# Patient Record
Sex: FEMALE | Race: BLACK OR AFRICAN AMERICAN | Employment: FULL TIME | URBAN - METROPOLITAN AREA
[De-identification: names, ages, dates, MRNs, and addresses within clinical notes are randomized per-mention and may not be internally consistent; named-entity substitution may affect disease eponyms.]

---

## 2020-08-25 ENCOUNTER — HOSPITAL ENCOUNTER (EMERGENCY)
Facility: HOSPITAL | Age: 45
Discharge: HOME | End: 2020-08-25
Attending: EMERGENCY MEDICINE | Admitting: EMERGENCY MEDICINE
Payer: COMMERCIAL

## 2020-08-25 VITALS
TEMPERATURE: 98.4 F | HEIGHT: 64 IN | SYSTOLIC BLOOD PRESSURE: 127 MMHG | OXYGEN SATURATION: 99 % | BODY MASS INDEX: 34.51 KG/M2 | DIASTOLIC BLOOD PRESSURE: 65 MMHG | HEART RATE: 110 BPM | WEIGHT: 202.16 LBS | RESPIRATION RATE: 18 BRPM

## 2020-08-25 DIAGNOSIS — L73.2 HIDRADENITIS: Primary | ICD-10-CM

## 2020-08-25 DIAGNOSIS — L02.91 ABSCESS: ICD-10-CM

## 2020-08-25 PROCEDURE — 99284 EMERGENCY DEPT VISIT MOD MDM: CPT | Performed by: EMERGENCY MEDICINE

## 2020-08-25 PROCEDURE — 99282 EMERGENCY DEPT VISIT SF MDM: CPT

## 2020-08-25 PROCEDURE — 10060 I&D ABSCESS SIMPLE/SINGLE: CPT | Performed by: EMERGENCY MEDICINE

## 2020-08-25 RX ORDER — LIDOCAINE HYDROCHLORIDE AND EPINEPHRINE 10; 10 MG/ML; UG/ML
5 INJECTION, SOLUTION INFILTRATION; PERINEURAL ONCE
Status: COMPLETED | OUTPATIENT
Start: 2020-08-25 | End: 2020-08-25

## 2020-08-25 RX ORDER — LIDOCAINE 40 MG/G
CREAM TOPICAL ONCE
Status: COMPLETED | OUTPATIENT
Start: 2020-08-25 | End: 2020-08-25

## 2020-08-25 RX ORDER — DOXYCYCLINE HYCLATE 100 MG/1
100 CAPSULE ORAL ONCE
Status: COMPLETED | OUTPATIENT
Start: 2020-08-25 | End: 2020-08-25

## 2020-08-25 RX ORDER — DOXYCYCLINE HYCLATE 100 MG/1
100 CAPSULE ORAL 2 TIMES DAILY
Qty: 20 CAPSULE | Refills: 0 | Status: SHIPPED | OUTPATIENT
Start: 2020-08-25 | End: 2020-09-04

## 2020-08-25 RX ADMIN — DOXYCYCLINE 100 MG: 100 CAPSULE ORAL at 18:44

## 2020-08-25 RX ADMIN — LIDOCAINE HYDROCHLORIDE AND EPINEPHRINE 5 ML: 10; 10 INJECTION, SOLUTION INFILTRATION; PERINEURAL at 18:41

## 2020-08-25 RX ADMIN — LIDOCAINE 1 APPLICATION: 40 CREAM TOPICAL at 17:48

## 2020-08-25 NOTE — ED PROVIDER NOTES
History  Chief Complaint   Patient presents with    Abscess     pt sent for left groin abscess, sent by urgent care for further eval of abscess and wbc count  hx of similar s/s  Patient is a 30-year-old female with a history of diabetes and hidradenitis suppurativa who presents with an abscess  Patient states that she developed a left inguinal abscess 2 days ago  She has had similar abscesses in the past   They occasionally drained spontaneously  However this one has not been draining and has become more swollen and painful  She went to an urgent care facility and was sent to the emergency department for further evaluation  She denies fever, chills, nausea, vomiting or other complaints  She had surgery for similar issues in her bilateral axillary regions  She is seeing a surgeon on September 1, 2020 to discuss possible surgery to remove the sweat glands in her inguinal regions  She denies a history of MRSA  History provided by:  Patient  Abscess   Location:  Torso and pelvis  Pelvic abscess location:  Groin and pelvis  Abscess quality: draining, fluctuance, induration and painful    Red streaking: no    Progression:  Worsening  Pain details:     Quality:  Pressure    Severity:  Moderate    Timing:  Constant    Progression:  Worsening  Chronicity:  Recurrent  Context: diabetes    Ineffective treatments:  Warm water soaks  Associated symptoms: no fatigue, no fever, no headaches, no nausea and no vomiting    Risk factors: prior abscess    Risk factors: no hx of MRSA        None       Past Medical History:   Diagnosis Date    Diabetes mellitus ([facility] [address] )        History reviewed  No pertinent surgical history  History reviewed  No pertinent family history  I have reviewed and agree with the history as documented      E-Cigarette/Vaping     E-Cigarette/Vaping Substances     Social History     Tobacco Use    Smoking status: Never Smoker    Smokeless tobacco: Never Used   Substance Use Topics    Alcohol use: Yes     Comment: social    Drug use: Never       Review of Systems   Constitutional: Negative for chills, diaphoresis, fatigue and fever  HENT: Negative for nosebleeds, sore throat and trouble swallowing  Eyes: Negative for photophobia, pain and visual disturbance  Respiratory: Negative for cough, chest tightness and shortness of breath  Cardiovascular: Negative for chest pain, palpitations and leg swelling  Gastrointestinal: Negative for abdominal pain, constipation, diarrhea, nausea and vomiting  Endocrine: Negative for polydipsia and polyuria  Genitourinary: Negative for difficulty urinating, dysuria, hematuria, pelvic pain, vaginal bleeding and vaginal discharge  Musculoskeletal: Negative for back pain, neck pain and neck stiffness  Skin: Negative for pallor and rash  Neurological: Negative for dizziness, seizures, light-headedness and headaches  All other systems reviewed and are negative  Physical Exam  Physical Exam  Vitals signs and nursing note reviewed  Constitutional:       General: She is not in acute distress  Appearance: She is well-developed  HENT:      Head: Normocephalic and atraumatic  Eyes:      Pupils: Pupils are equal, round, and reactive to light  Neck:      Musculoskeletal: Normal range of motion and neck supple  Cardiovascular:      Rate and Rhythm: Normal rate and regular rhythm  Pulses: Normal pulses  Heart sounds: Normal heart sounds  Pulmonary:      Effort: Pulmonary effort is normal  No respiratory distress  Breath sounds: Normal breath sounds  Abdominal:      General: There is no distension  Palpations: Abdomen is soft  Abdomen is not rigid  Tenderness: There is no abdominal tenderness  There is no guarding or rebound  Musculoskeletal: Normal range of motion  General: No tenderness  Lymphadenopathy:      Cervical: No cervical adenopathy  Skin:     General: Skin is warm and dry        Capillary Refill: Capillary refill takes less than 2 seconds  Findings: Abscess (area of flutuance and tenderness in the left pelvis adjacent to the inguinal crease  there is a pinpoint area of purulent drainage  ) present  Neurological:      Mental Status: She is alert and oriented to person, place, and time  Cranial Nerves: No cranial nerve deficit  Sensory: No sensory deficit  Vital Signs  ED Triage Vitals   Temperature Pulse Respirations Blood Pressure SpO2   08/25/20 1728 08/25/20 1728 08/25/20 1728 08/25/20 1728 08/25/20 1728   98 4 °F (36 9 °C) (!) 110 18 127/65 99 %      Temp Source Heart Rate Source Patient Position - Orthostatic VS BP Location FiO2 (%)   08/25/20 1728 08/25/20 1728 08/25/20 1728 08/25/20 1728 --   Oral Monitor Sitting Right arm       Pain Score       08/25/20 1726       Worst Possible Pain           Vitals:    08/25/20 1728   BP: 127/65   Pulse: (!) 110   Patient Position - Orthostatic VS: Sitting         Visual Acuity      ED Medications  Medications   lidocaine (LMX) 4 % cream (1 application Topical Given 8/25/20 1748)   lidocaine-epinephrine (XYLOCAINE/EPINEPHRINE) 1 %-1:100,000 injection 5 mL (5 mL Infiltration Given by Other 8/25/20 1841)   doxycycline hyclate (VIBRAMYCIN) capsule 100 mg (100 mg Oral Given 8/25/20 1844)       Diagnostic Studies  Results Reviewed     None                 No orders to display              Procedures  Incision and drain    Date/Time: 8/25/2020 6:00 PM  Performed by: Gail Galvan DO  Authorized by: Gail Galvan DO     Patient location:  ED  Procedure performed by consultant: female chaperone      Consent:     Consent obtained:  Verbal    Consent given by:  Patient    Risks discussed:  Bleeding, incomplete drainage, pain, infection and damage to other organs    Alternatives discussed:  Alternative treatment  Universal protocol:     Procedure explained and questions answered to patient or proxy's satisfaction: yes      Patient identity confirmed:  Verbally with patient and arm band  Location:     Type:  Abscess    Size:  3x3 cm  Pre-procedure details:     Skin preparation:  Chloraprep  Anesthesia (see MAR for exact dosages): Anesthesia method:  Topical application and local infiltration    Topical anesthetic:  Lidocaine gel    Local anesthetic:  Lidocaine 1% WITH epi  Procedure details:     Complexity:  Simple    Needle aspiration: no      Incision types:  Stab incision    Scalpel blade:  11    Approach:  Open    Incision depth:  Subcutaneous    Wound management:  Probed and deloculated and irrigated with saline    Drainage:  Purulent    Drainage amount:  Copious    Wound treatment:  Wound left open    Packing materials:  None  Post-procedure details:     Patient tolerance of procedure: Tolerated well, no immediate complications             ED Course                                             MDM  Number of Diagnoses or Management Options  Abscess: new and requires workup  Hidradenitis: new and requires workup  Diagnosis management comments: Patient with a history of hidradenitis suppurative a presents with a draining abscess in the left pelvis/groin  Incision and drainage was performed at bedside  Wound was left open and patient was advised to clean with soap and water  Will place on antibiotics to cover for staph and strep, including MRSA  Patient is not septic appearing  She denies any chance of pregnancy  She is appropriate for outpatient treatment  However she was given strict return precautions and will observe for signs of worsening infection         Amount and/or Complexity of Data Reviewed  Review and summarize past medical records: yes    Risk of Complications, Morbidity, and/or Mortality  Presenting problems: high  Diagnostic procedures: minimal  Management options: moderate    Patient Progress  Patient progress: improved        Disposition  Final diagnoses:   Hidradenitis   Abscess     Time reflects when diagnosis was documented in both MDM as applicable and the Disposition within this note     Time User Action Codes Description Comment    8/25/2020  6:25 PM Gonzalo Rivas Add [L73 2] Hidradenitis     8/25/2020  6:25 PM Gonzalo Rivas Add [L02 91] Abscess       ED Disposition     ED Disposition Condition Date/Time Comment    Discharge Stable Tue Aug 25, 2020  6:23 PM Donovan Morillo discharge to home/self care  Follow-up Information     Follow up With Specialties Details Why Contact Umesh Vuong  Schedule an appointment as soon as possible for a visit  Return to ED sooner if symptoms worsen or persist  [address] [address]            Discharge Medication List as of 8/25/2020  6:27 PM      START taking these medications    Details   doxycycline hyclate (VIBRAMYCIN) 100 mg capsule Take 1 capsule (100 mg total) by mouth 2 (two) times a day for 10 days, Starting Tue 8/25/2020, Until Fri 9/4/2020, Normal           No discharge procedures on file      PDMP Review     None          ED Provider  Electronically Signed by           Dania Cadet DO  08/26/20 [id number]

## 2020-08-25 NOTE — Clinical Note
Jluis Nugent was seen and treated in our emergency department on 8/25/2020  Diagnosis:     Cindy Mendoza    She may return on this date: 08/27/2020         If you have any questions or concerns, please don't hesitate to call        Rosario Marroquin DO    ______________________________           _______________          _______________  Hospital Representative                              Date                                Time

## 2024-02-27 ENCOUNTER — OFFICE VISIT (OUTPATIENT)
Dept: URGENT CARE | Facility: MEDICAL CENTER | Age: 49
End: 2024-02-27
Payer: COMMERCIAL

## 2024-02-27 VITALS
WEIGHT: 200 LBS | OXYGEN SATURATION: 97 % | HEIGHT: 64 IN | DIASTOLIC BLOOD PRESSURE: 74 MMHG | HEART RATE: 115 BPM | RESPIRATION RATE: 18 BRPM | BODY MASS INDEX: 34.15 KG/M2 | TEMPERATURE: 98.4 F | SYSTOLIC BLOOD PRESSURE: 131 MMHG

## 2024-02-27 DIAGNOSIS — R68.89 FLU-LIKE SYMPTOMS: Primary | ICD-10-CM

## 2024-02-27 LAB
SARS-COV-2 AG UPPER RESP QL IA: NEGATIVE
VALID CONTROL: NORMAL

## 2024-02-27 PROCEDURE — 99213 OFFICE O/P EST LOW 20 MIN: CPT | Performed by: PHYSICIAN ASSISTANT

## 2024-02-27 PROCEDURE — 87636 SARSCOV2 & INF A&B AMP PRB: CPT | Performed by: PHYSICIAN ASSISTANT

## 2024-02-27 PROCEDURE — 87811 SARS-COV-2 COVID19 W/OPTIC: CPT | Performed by: PHYSICIAN ASSISTANT

## 2024-02-27 RX ORDER — PROCHLORPERAZINE 25 MG/1
SUPPOSITORY RECTAL
COMMUNITY
Start: 2023-12-09

## 2024-02-27 RX ORDER — OSELTAMIVIR PHOSPHATE 75 MG/1
75 CAPSULE ORAL 2 TIMES DAILY
Qty: 10 CAPSULE | Refills: 0 | Status: SHIPPED | OUTPATIENT
Start: 2024-02-27 | End: 2024-03-03

## 2024-02-27 NOTE — PATIENT INSTRUCTIONS
Flulike symptoms  Tamiflu as directed-may stop Tamiflu if flu test is negative  Follow up with PCP in 3-5 days.  Proceed to  ER if symptoms worsen

## 2024-02-27 NOTE — LETTER
February 27, 2024     Patient: Yumiko Watters   YOB: 1975   Date of Visit: 2/27/2024       To Whom it May Concern:    Yumiko Watters was seen in my clinic on 2/27/2024. She may return to work when she is fever free x 24 hours without fever reducing medication.    If you have any questions or concerns, please don't hesitate to call.         Sincerely,          Colin Sharp PA-C        CC: No Recipients

## 2024-02-27 NOTE — PROGRESS NOTES
[facility]        NAME: Yumiko Watters is a 48 y.o. female  : 1975    MRN: [id number]  DATE: 2024  TIME: 9:44 AM    Assessment and Plan   Flu-like symptoms [R68.89]  1. Flu-like symptoms              Patient Instructions     Flulike symptoms  Tamiflu as directed-may stop Tamiflu if flu test is negative  Follow up with PCP in 3-5 days.  Proceed to  ER if symptoms worsen.    Chief Complaint     Chief Complaint   Patient presents with    Cold Like Symptoms     Started this morning with short of breath, cough and body aches.           History of Present Illness       48-year-old female who presents complaining of generalized body aches cough, congestion, fatigue, fever, chills.  Patient states that the symptoms started last night.  Denies chest pain, shortness of breath        Review of Systems   Review of Systems   Constitutional:  Positive for chills, fatigue and fever. Negative for activity change, appetite change and diaphoresis.   HENT:  Positive for congestion and rhinorrhea. Negative for ear discharge, ear pain, facial swelling, hearing loss, mouth sores, nosebleeds, postnasal drip, sinus pressure, sinus pain, sneezing, sore throat and voice change.    Respiratory:  Positive for cough. Negative for apnea, choking, chest tightness, shortness of breath, wheezing and stridor.    Cardiovascular: Negative.          Current Medications       Current Outpatient Medications:     Continuous Blood Gluc Sensor (Dexcom G6 Sensor) MISC, TEST BLOOD GLUCOSE LEVELS EVERY 4 HOURS WHILE AWAKE, Disp: , Rfl:     Current Allergies     Allergies as of 2024    (No Known Allergies)            The following portions of the patient's history were reviewed and updated as appropriate: allergies, current medications, past family history, past medical history, past social history, past surgical history and problem list.     Past Medical History:   Diagnosis Date    Diabetes mellitus (HCC)   "      Past Surgical History:   Procedure Laterality Date    SKIN SURGERY         No family history on file.      Medications have been verified.        Objective   /74   Pulse (!) 115   Temp 98.4 °F (36.9 °C) (Temporal)   Resp 18   Ht 5' 4\" (1.626 m)   Wt 90.7 kg (200 lb)   SpO2 97%   BMI 34.33 kg/m²        Physical Exam     Physical Exam  Constitutional:       General: She is not in acute distress.     Appearance: Normal appearance. She is well-developed. She is not diaphoretic.   HENT:      Head: Normocephalic and atraumatic.      Right Ear: Hearing, tympanic membrane, ear canal and external ear normal.      Left Ear: Hearing, tympanic membrane, ear canal and external ear normal.      Nose: Rhinorrhea present.      Mouth/Throat:      Pharynx: Uvula midline.   Cardiovascular:      Rate and Rhythm: Normal rate and regular rhythm.      Heart sounds: Normal heart sounds.   Pulmonary:      Effort: Pulmonary effort is normal. No respiratory distress.      Breath sounds: Normal breath sounds. No stridor. No wheezing, rhonchi or rales.   Chest:      Chest wall: No tenderness.   Musculoskeletal:      Cervical back: Normal range of motion and neck supple.   Lymphadenopathy:      Cervical: Cervical adenopathy present.   Neurological:      Mental Status: She is alert.                   "

## 2024-02-28 LAB
FLUAV RNA RESP QL NAA+PROBE: POSITIVE
FLUBV RNA RESP QL NAA+PROBE: NEGATIVE
SARS-COV-2 RNA RESP QL NAA+PROBE: NEGATIVE